# Patient Record
(demographics unavailable — no encounter records)

---

## 2024-12-31 NOTE — PHYSICAL EXAM
[Normal] : no posterior cervical lymphadenopathy and no anterior cervical lymphadenopathy [de-identified] : + post nasal drip

## 2024-12-31 NOTE — ASSESSMENT
[FreeTextEntry1] : VSS  patient finished course of azithromycin medication as prescribed, medication education done  advised to increase fluid intake, rest, and acetaminophen/ibuprofen prn pain/fever will follow up cxr  follow up in office if sx persists or worsens patient verbalizes understanding and is stable upon d/c

## 2024-12-31 NOTE — HISTORY OF PRESENT ILLNESS
[FreeTextEntry8] : 21 yo m, with no sig pmhx, c/o cough  c/o cough and congestion sx have been ongoing since 1 month  symtoms include: productive cough, chest tightness with cough denies any sob, fever or chills Sick contacts- none recent travel- none self treatment- used antibiotics about 2 weeks  tested prior- did not test for covid recently  vaccine status- got covid vaccine last year  denies any other associated symptoms  denies any other complaints or concerns at this time

## 2024-12-31 NOTE — PHYSICAL EXAM
[Normal] : no posterior cervical lymphadenopathy and no anterior cervical lymphadenopathy [de-identified] : + post nasal drip